# Patient Record
Sex: FEMALE | URBAN - METROPOLITAN AREA
[De-identification: names, ages, dates, MRNs, and addresses within clinical notes are randomized per-mention and may not be internally consistent; named-entity substitution may affect disease eponyms.]

---

## 2022-06-10 ENCOUNTER — APPOINTMENT (RX ONLY)
Dept: URBAN - METROPOLITAN AREA CLINIC 216 | Facility: CLINIC | Age: 67
Setting detail: DERMATOLOGY
End: 2022-06-10

## 2022-06-10 DIAGNOSIS — Z41.9 ENCOUNTER FOR PROCEDURE FOR PURPOSES OTHER THAN REMEDYING HEALTH STATE, UNSPECIFIED: ICD-10-CM

## 2022-06-10 PROCEDURE — ? COSMETIC CONSULTATION: BOTOX

## 2022-06-10 PROCEDURE — ? PATIENT SPECIFIC COUNSELING

## 2022-06-10 PROCEDURE — ? COSMETIC CONSULTATION: FILLERS

## 2023-05-04 ENCOUNTER — APPOINTMENT (RX ONLY)
Dept: URBAN - METROPOLITAN AREA CLINIC 216 | Facility: CLINIC | Age: 68
Setting detail: DERMATOLOGY
End: 2023-05-04

## 2023-05-04 DIAGNOSIS — Z41.9 ENCOUNTER FOR PROCEDURE FOR PURPOSES OTHER THAN REMEDYING HEALTH STATE, UNSPECIFIED: ICD-10-CM

## 2023-05-04 PROCEDURE — ? BOTOX

## 2023-05-04 PROCEDURE — ? JUVEDERM VOLUMA XC INJECTION

## 2023-05-04 PROCEDURE — ? COSMETIC CONSULTATION - MICRO-NEEDLING

## 2023-05-04 PROCEDURE — ? JUVEDERM ULTRA PLUS INJECTION

## 2023-05-04 PROCEDURE — ? JUVEDERM VOLBELLA INJECTION

## 2023-05-04 PROCEDURE — ? COSMETIC CONSULTATION: FILLERS

## 2023-05-04 NOTE — PROCEDURE: BOTOX
Price (Use Numbers Only, No Special Characters Or $): 446 Price (Use Numbers Only, No Special Characters Or $): 445

## 2023-05-04 NOTE — PROCEDURE: JUVEDERM ULTRA PLUS INJECTION
Procedural Text: The filler was administered to the treatment areas noted above.\\n\\n$150 off 2 syringes honoring spring promo

## 2023-05-04 NOTE — PROCEDURE: JUVEDERM VOLUMA XC INJECTION
Price (Use Numbers Only, No Special Characters Or $): 687 Price (Use Numbers Only, No Special Characters Or $): 775

## 2023-05-04 NOTE — PROCEDURE: JUVEDERM ULTRA PLUS INJECTION
Price (Use Numbers Only, No Special Characters Or $): 020 Price (Use Numbers Only, No Special Characters Or $): 599

## 2023-05-04 NOTE — PROCEDURE: JUVEDERM VOLUMA XC INJECTION
Post-Care Instructions: Patient instructed to apply ice to reduce swelling. Avoid dental procedures for 2 weeks- she has dental cleaning scheduled tomorrow but she agrees to reschedule her dental cleaning for at least 2 weeks from today.

## 2023-05-04 NOTE — PROCEDURE: JUVEDERM VOLBELLA INJECTION
Price (Use Numbers Only, No Special Characters Or $): 791 Price (Use Numbers Only, No Special Characters Or $): 807

## 2024-11-07 NOTE — HPI: COSMETIC (FILLERS)
Called patient with results. Patient verbalized understanding.    Have You Had Fillers Before?: has had fillers